# Patient Record
Sex: MALE | Race: ASIAN | NOT HISPANIC OR LATINO | Employment: STUDENT | URBAN - METROPOLITAN AREA
[De-identification: names, ages, dates, MRNs, and addresses within clinical notes are randomized per-mention and may not be internally consistent; named-entity substitution may affect disease eponyms.]

---
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
## 2021-03-23 ENCOUNTER — TRANSCRIBE ORDERS (OUTPATIENT)
Dept: ADMINISTRATIVE | Facility: HOSPITAL | Age: 21
End: 2021-03-23

## 2021-03-23 ENCOUNTER — HOSPITAL ENCOUNTER (OUTPATIENT)
Dept: RADIOLOGY | Facility: HOSPITAL | Age: 21
Discharge: HOME | End: 2021-03-23
Payer: COMMERCIAL

## 2021-03-23 DIAGNOSIS — M25.571 PAIN IN JOINT INVOLVING RIGHT ANKLE AND FOOT: ICD-10-CM

## 2021-03-23 DIAGNOSIS — M25.571 PAIN IN JOINT INVOLVING RIGHT ANKLE AND FOOT: Primary | ICD-10-CM

## 2021-03-23 PROCEDURE — 73630 X-RAY EXAM OF FOOT: CPT

## 2021-09-04 ENCOUNTER — HOSPITAL ENCOUNTER (EMERGENCY)
Facility: HOSPITAL | Age: 21
Discharge: HOME | End: 2021-09-04
Attending: EMERGENCY MEDICINE | Admitting: EMERGENCY MEDICINE
Payer: COMMERCIAL

## 2021-09-04 VITALS
DIASTOLIC BLOOD PRESSURE: 81 MMHG | OXYGEN SATURATION: 100 % | SYSTOLIC BLOOD PRESSURE: 127 MMHG | TEMPERATURE: 98 F | HEART RATE: 109 BPM | RESPIRATION RATE: 18 BRPM

## 2021-09-04 DIAGNOSIS — F10.929 ACUTE ALCOHOL INTOXICATION (HCC): Primary | ICD-10-CM

## 2021-09-04 LAB
ETHANOL EXG-MCNC: 0.89 MG/DL
GLUCOSE SERPL-MCNC: 90 MG/DL (ref 65–140)

## 2021-09-04 PROCEDURE — 82075 ASSAY OF BREATH ETHANOL: CPT

## 2021-09-04 PROCEDURE — 82948 REAGENT STRIP/BLOOD GLUCOSE: CPT

## 2021-09-04 PROCEDURE — 99284 EMERGENCY DEPT VISIT MOD MDM: CPT

## 2021-09-04 PROCEDURE — 99284 EMERGENCY DEPT VISIT MOD MDM: CPT | Performed by: EMERGENCY MEDICINE

## 2021-09-04 RX ORDER — ONDANSETRON 4 MG/1
4 TABLET, ORALLY DISINTEGRATING ORAL ONCE
Status: COMPLETED | OUTPATIENT
Start: 2021-09-04 | End: 2021-09-04

## 2021-09-04 RX ADMIN — ONDANSETRON 4 MG: 4 TABLET, ORALLY DISINTEGRATING ORAL at 03:48

## 2021-09-04 NOTE — ED PROVIDER NOTES
History  Chief Complaint   Patient presents with    Alcohol Intoxication     pt intoxicated from [place]  pt's roomates stated that he was not acting himself  Patient is a 23year old male with no significant history presenting with suspected acute alcohol intoxication  According to EMS, the patient's roommates called to report that the patient was not acting himself after drinking an unspecified amount of alcohol earlier that night  In talking to the patient, he initially appears altered and confused, however after further questioning, patient is able to provide an accurate history  He states that he was drinking earlier that night at [employer] where he is a student  He is denying any other drug use  He is unsure how much he drank  He remembers drinking the entire night and is denying passing out or hitting his head  He feels nauseous, but has no other acute complaints  He has not vomited  He has never experienced alcohol withdrawal and does not regularly drink alcohol  He is denying headaches, lightheadedness, visual changes, seizures, chest pain, difficulty breathing, abdominal pain, or any other concerns at this time  None       History reviewed  No pertinent past medical history  History reviewed  No pertinent surgical history  History reviewed  No pertinent family history  I have reviewed and agree with the history as documented  E-Cigarette/Vaping     E-Cigarette/Vaping Substances     Social History     Tobacco Use    Smoking status: Never Smoker    Smokeless tobacco: Never Used   Substance Use Topics    Alcohol use: No    Drug use: No        Review of Systems   Constitutional: Negative for chills and fever  HENT: Negative for ear pain, rhinorrhea and sore throat  Eyes: Negative for pain  Respiratory: Negative for shortness of breath  Cardiovascular: Negative for chest pain  Gastrointestinal: Positive for nausea   Negative for abdominal pain, constipation, diarrhea and vomiting  Genitourinary: Negative for dysuria  Musculoskeletal: Negative for myalgias  Skin: Negative for rash  Neurological: Negative for dizziness, seizures, syncope, light-headedness, numbness and headaches  Psychiatric/Behavioral: Negative for agitation  All other systems reviewed and are negative  Physical Exam  ED Triage Vitals   Temperature Pulse Respirations Blood Pressure SpO2   09/04/21 0218 09/04/21 0219 09/04/21 0219 09/04/21 0219 09/04/21 0219   98 °F (36 7 °C) (!) 109 18 127/81 100 %      Temp Source Heart Rate Source Patient Position - Orthostatic VS BP Location FiO2 (%)   09/04/21 0218 09/04/21 0219 09/04/21 0219 09/04/21 0219 --   Oral Monitor Sitting Right arm       Pain Score       --                    Orthostatic Vital Signs  Vitals:    09/04/21 0219   BP: 127/81   Pulse: (!) 109   Patient Position - Orthostatic VS: Sitting       Physical Exam  Vitals and nursing note reviewed  Constitutional:       General: He is not in acute distress  Appearance: Normal appearance  He is normal weight  HENT:      Head: Normocephalic and atraumatic  Right Ear: External ear normal       Left Ear: External ear normal       Nose: Nose normal    Eyes:      General: No scleral icterus  Right eye: No discharge  Left eye: No discharge  Extraocular Movements: Extraocular movements intact  Conjunctiva/sclera: Conjunctivae normal       Pupils: Pupils are equal, round, and reactive to light  Cardiovascular:      Rate and Rhythm: Normal rate and regular rhythm  Pulses: Normal pulses  Heart sounds: Normal heart sounds  No murmur heard  No friction rub  No gallop  Pulmonary:      Effort: Pulmonary effort is normal  No respiratory distress  Breath sounds: Normal breath sounds  Abdominal:      General: Abdomen is flat  Bowel sounds are normal  There is no distension  Palpations: Abdomen is soft  There is no mass        Tenderness: There is no abdominal tenderness  Musculoskeletal:         General: Normal range of motion  Cervical back: Normal range of motion  Skin:     General: Skin is warm and dry  Neurological:      General: No focal deficit present  Mental Status: He is alert and oriented to person, place, and time  GCS: GCS eye subscore is 4  GCS verbal subscore is 5  GCS motor subscore is 6  Cranial Nerves: Cranial nerves are intact  No cranial nerve deficit, dysarthria or facial asymmetry  Sensory: Sensation is intact  No sensory deficit  Motor: Motor function is intact  No seizure activity or pronator drift  Coordination: Coordination is intact  Finger-Nose-Finger Test and Heel to [facility] Test normal       Gait: Gait is intact  Psychiatric:         Attention and Perception: Attention normal  He does not perceive auditory or visual hallucinations  Mood and Affect: Mood normal          Speech: Speech normal          Behavior: Behavior normal  Behavior is cooperative  Thought Content: Thought content normal  Thought content does not include homicidal or suicidal ideation  Cognition and Memory: Cognition and memory normal          Judgment: Judgment normal          ED Medications  Medications   ondansetron (ZOFRAN-ODT) dispersible tablet 4 mg (4 mg Oral Given 9/4/21 0348)       Diagnostic Studies  Results Reviewed     Procedure Component Value Units Date/Time    Fingerstick Glucose (POCT) [[id number]]  (Normal) Collected: 09/04/21 0346    Lab Status: Final result Updated: 09/04/21 0347     POC Glucose 90 mg/dl     POCT alcohol breath test [[id number]]  (Normal) Resulted: 09/04/21 0344    Lab Status: Final result Updated: 09/04/21 0344     EXTBreath Alcohol 0 89                 No orders to display         Procedures  Procedures      ED Course  ED Course as of Sep 04 1725   Sat Sep 04, 2021   [id number] Patient examined by me  Vitals reviewed  He is tachycardic  MDM  Number of Diagnoses or Management Options  Acute alcohol intoxication [facility]): new and requires workup  Diagnosis management comments: Patient is a 23year old male presenting with acute alcohol intoxication  Based on history and evaluation, this appears to be low risk, mildly symptomatic, alcohol intoxication  He is low risk for withdrawal  He is currently denying other drug use  Plan: fingerstick glucose, zofran    Patient states that his nausea has improved with zofran  His glucose is within normal limits  His alcohol level is elevated, however it is low enough that he can be safely discharged home  He states that he will be walking home because he lives nearby  He has roommates who the patient states can watch after him  We discussed safe drinking habits with the patient  He seems to understand this plan and is agreeable  Patient discharged home with return precautions         Amount and/or Complexity of Data Reviewed  Clinical lab tests: ordered and reviewed  Obtain history from someone other than the patient: yes  Review and summarize past medical records: yes  Independent visualization of images, tracings, or specimens: yes    Risk of Complications, Morbidity, and/or Mortality  Presenting problems: low  Diagnostic procedures: low  Management options: low    Patient Progress  Patient progress: improved      Disposition  Final diagnoses:   Acute alcohol intoxication ([facility] )     Time reflects when diagnosis was documented in both MDM as applicable and the Disposition within this note     Time User Action Codes Description Comment    9/4/2021  4:11 AM Deborah Balling Add [N63 [id number]] [facility]     2/3/4023  4:14 AM Deborah Balling Remove [G04 [id number]] Alcoholic intoxication with complication ([facility] )     4/5/9717  4:14 AM Deborah Balling Add [F10 [id number]] Acute alcohol intoxication [facility])       ED Disposition     ED Disposition Condition Date/Time Comment    Discharge Stable Sat Sep 4, 2021  4:13 AM Mary Lamb discharge to home/self care  Follow-up Information    None         There are no discharge medications for this patient  No discharge procedures on file  PDMP Review     None           ED Provider  Attending physically available and evaluated Mary Lamb I managed the patient along with the ED Attending      Electronically Signed by         Jass Osorio DO  09/04/21 5613

## 2021-09-04 NOTE — DISCHARGE INSTRUCTIONS
You were seen in the emergency department with alcohol intoxication  We gave you zofran for your nausea which helped improve your symptoms  We also checked your blood sugar which was normal  After your exam, we did not find any emergent cause for your symptoms, and believe that you are stable for discharge home  Please return to the emergency department if you experience any of the following: uncontrolled nausea and vomiting, chest pain, difficulty breathing, or any other concerns

## 2021-09-07 NOTE — ED ATTENDING ATTESTATION
9/4/2021  ILeah DO, saw and evaluated the patient  I have discussed the patient with the resident/non-physician practitioner and agree with the resident's/non-physician practitioner's findings, Plan of Care, and MDM as documented in the resident's/non-physician practitioner's note, except where noted  All available labs and Radiology studies were reviewed  I was present for key portions of any procedure(s) performed by the resident/non-physician practitioner and I was immediately available to provide assistance  At this point I agree with the current assessment done in the Emergency Department  I have conducted an independent evaluation of this patient a history and physical is as follows:    18-year-old male presents with alcohol intoxication  Patient is remains culture reports that patient was not acting himself after drinking an unknown amount of alcohol  Patient admits to drinking alcohol, denies additional drug use, denies trauma  Denies all complaints at this time  On exam-no acute distress, answers questions appropriately, no obvious sign of trauma, heart mildly tachycardic, no respiratory distress  Plan-observe until more sober or until patient is able to find a sober ride      ED Course         Critical Care Time  Procedures